# Patient Record
Sex: FEMALE | Race: WHITE | URBAN - METROPOLITAN AREA
[De-identification: names, ages, dates, MRNs, and addresses within clinical notes are randomized per-mention and may not be internally consistent; named-entity substitution may affect disease eponyms.]

---

## 2021-05-25 ENCOUNTER — RECORDS - HEALTHEAST (OUTPATIENT)
Dept: ADMINISTRATIVE | Facility: CLINIC | Age: 71
End: 2021-05-25

## 2021-05-29 ENCOUNTER — RECORDS - HEALTHEAST (OUTPATIENT)
Dept: ADMINISTRATIVE | Facility: CLINIC | Age: 71
End: 2021-05-29

## 2021-05-30 ENCOUNTER — RECORDS - HEALTHEAST (OUTPATIENT)
Dept: ADMINISTRATIVE | Facility: CLINIC | Age: 71
End: 2021-05-30

## 2021-05-31 ENCOUNTER — RECORDS - HEALTHEAST (OUTPATIENT)
Dept: ADMINISTRATIVE | Facility: CLINIC | Age: 71
End: 2021-05-31

## 2021-07-13 ENCOUNTER — RECORDS - HEALTHEAST (OUTPATIENT)
Dept: ADMINISTRATIVE | Facility: CLINIC | Age: 71
End: 2021-07-13

## 2021-07-21 ENCOUNTER — RECORDS - HEALTHEAST (OUTPATIENT)
Dept: ADMINISTRATIVE | Facility: CLINIC | Age: 71
End: 2021-07-21

## 2021-10-13 ENCOUNTER — APPOINTMENT (OUTPATIENT)
Dept: URBAN - METROPOLITAN AREA CLINIC 260 | Age: 71
Setting detail: DERMATOLOGY
End: 2021-10-13

## 2021-10-13 VITALS — WEIGHT: 135 LBS | HEIGHT: 60 IN

## 2021-10-13 DIAGNOSIS — L81.0 POSTINFLAMMATORY HYPERPIGMENTATION: ICD-10-CM

## 2021-10-13 DIAGNOSIS — L81.4 OTHER MELANIN HYPERPIGMENTATION: ICD-10-CM

## 2021-10-13 PROCEDURE — 99203 OFFICE O/P NEW LOW 30 MIN: CPT

## 2021-10-13 PROCEDURE — OTHER ADDITIONAL NOTES: OTHER

## 2021-10-13 PROCEDURE — OTHER COUNSELING: OTHER

## 2021-10-13 ASSESSMENT — LOCATION ZONE DERM
LOCATION ZONE: FACE
LOCATION ZONE: EYELID

## 2021-10-13 ASSESSMENT — LOCATION DETAILED DESCRIPTION DERM
LOCATION DETAILED: LEFT LATERAL CANTHUS
LOCATION DETAILED: LEFT INFERIOR MEDIAL FOREHEAD

## 2021-10-13 ASSESSMENT — LOCATION SIMPLE DESCRIPTION DERM
LOCATION SIMPLE: LEFT EYELID
LOCATION SIMPLE: LEFT FOREHEAD

## 2021-10-13 NOTE — PROCEDURE: ADDITIONAL NOTES
Detail Level: Detailed
Render Risk Assessment In Note?: no
Additional Notes: Patient scratched herself last summer. The area appears to be pih at this time. She will allow this to heal and may treat with laser if she would like. Her  pointed this out

## 2025-02-24 ENCOUNTER — APPOINTMENT (OUTPATIENT)
Dept: RADIOLOGY | Facility: CLINIC | Age: 75
End: 2025-02-24
Payer: COMMERCIAL

## 2025-02-24 ENCOUNTER — HOSPITAL ENCOUNTER (EMERGENCY)
Facility: CLINIC | Age: 75
Discharge: HOME | End: 2025-02-24
Attending: EMERGENCY MEDICINE | Admitting: EMERGENCY MEDICINE
Payer: COMMERCIAL

## 2025-02-24 VITALS
SYSTOLIC BLOOD PRESSURE: 172 MMHG | BODY MASS INDEX: 28.22 KG/M2 | DIASTOLIC BLOOD PRESSURE: 88 MMHG | OXYGEN SATURATION: 96 % | WEIGHT: 140 LBS | TEMPERATURE: 97.4 F | RESPIRATION RATE: 18 BRPM | HEIGHT: 59 IN | HEART RATE: 88 BPM

## 2025-02-24 DIAGNOSIS — M79.622 PAIN OF LEFT UPPER ARM: ICD-10-CM

## 2025-02-24 LAB
ANION GAP SERPL CALCULATED.3IONS-SCNC: 9 MMOL/L (ref 7–15)
ATRIAL RATE - MUSE: 83 BPM
BASOPHILS # BLD AUTO: 0 10E3/UL (ref 0–0.2)
BASOPHILS NFR BLD AUTO: 0 %
BUN SERPL-MCNC: 9.5 MG/DL (ref 8–23)
CALCIUM SERPL-MCNC: 9 MG/DL (ref 8.8–10.4)
CHLORIDE SERPL-SCNC: 108 MMOL/L (ref 98–107)
CREAT SERPL-MCNC: 0.66 MG/DL (ref 0.51–0.95)
DIASTOLIC BLOOD PRESSURE - MUSE: NORMAL MMHG
EGFRCR SERPLBLD CKD-EPI 2021: >90 ML/MIN/1.73M2
EOSINOPHIL # BLD AUTO: 0 10E3/UL (ref 0–0.7)
EOSINOPHIL NFR BLD AUTO: 0 %
ERYTHROCYTE [DISTWIDTH] IN BLOOD BY AUTOMATED COUNT: 14.6 % (ref 10–15)
GLUCOSE SERPL-MCNC: 108 MG/DL (ref 70–99)
HCO3 SERPL-SCNC: 26 MMOL/L (ref 22–29)
HCT VFR BLD AUTO: 38.5 % (ref 35–47)
HGB BLD-MCNC: 12.8 G/DL (ref 11.7–15.7)
IMM GRANULOCYTES # BLD: 0 10E3/UL
IMM GRANULOCYTES NFR BLD: 0 %
INTERPRETATION ECG - MUSE: NORMAL
LYMPHOCYTES # BLD AUTO: 1.4 10E3/UL (ref 0.8–5.3)
LYMPHOCYTES NFR BLD AUTO: 14 %
MCH RBC QN AUTO: 28.3 PG (ref 26.5–33)
MCHC RBC AUTO-ENTMCNC: 33.2 G/DL (ref 31.5–36.5)
MCV RBC AUTO: 85 FL (ref 78–100)
MONOCYTES # BLD AUTO: 0.5 10E3/UL (ref 0–1.3)
MONOCYTES NFR BLD AUTO: 5 %
NEUTROPHILS # BLD AUTO: 8.1 10E3/UL (ref 1.6–8.3)
NEUTROPHILS NFR BLD AUTO: 81 %
NRBC # BLD AUTO: 0 10E3/UL
NRBC BLD AUTO-RTO: 0 /100
P AXIS - MUSE: 68 DEGREES
PLATELET # BLD AUTO: 276 10E3/UL (ref 150–450)
POTASSIUM SERPL-SCNC: 3.9 MMOL/L (ref 3.4–5.3)
PR INTERVAL - MUSE: 148 MS
QRS DURATION - MUSE: 74 MS
QT - MUSE: 386 MS
QTC - MUSE: 453 MS
R AXIS - MUSE: 28 DEGREES
RBC # BLD AUTO: 4.52 10E6/UL (ref 3.8–5.2)
SODIUM SERPL-SCNC: 143 MMOL/L (ref 135–145)
SYSTOLIC BLOOD PRESSURE - MUSE: NORMAL MMHG
T AXIS - MUSE: 61 DEGREES
TROPONIN T SERPL HS-MCNC: 8 NG/L
VENTRICULAR RATE- MUSE: 83 BPM
WBC # BLD AUTO: 10 10E3/UL (ref 4–11)

## 2025-02-24 PROCEDURE — 80048 BASIC METABOLIC PNL TOTAL CA: CPT

## 2025-02-24 PROCEDURE — 93005 ELECTROCARDIOGRAM TRACING: CPT | Performed by: EMERGENCY MEDICINE

## 2025-02-24 PROCEDURE — 99285 EMERGENCY DEPT VISIT HI MDM: CPT | Mod: 25

## 2025-02-24 PROCEDURE — 85014 HEMATOCRIT: CPT

## 2025-02-24 PROCEDURE — 82310 ASSAY OF CALCIUM: CPT

## 2025-02-24 PROCEDURE — 85004 AUTOMATED DIFF WBC COUNT: CPT

## 2025-02-24 PROCEDURE — 84484 ASSAY OF TROPONIN QUANT: CPT

## 2025-02-24 PROCEDURE — 36415 COLL VENOUS BLD VENIPUNCTURE: CPT

## 2025-02-24 PROCEDURE — 71046 X-RAY EXAM CHEST 2 VIEWS: CPT

## 2025-02-24 ASSESSMENT — COLUMBIA-SUICIDE SEVERITY RATING SCALE - C-SSRS
2. HAVE YOU ACTUALLY HAD ANY THOUGHTS OF KILLING YOURSELF IN THE PAST MONTH?: NO
1. IN THE PAST MONTH, HAVE YOU WISHED YOU WERE DEAD OR WISHED YOU COULD GO TO SLEEP AND NOT WAKE UP?: NO
6. HAVE YOU EVER DONE ANYTHING, STARTED TO DO ANYTHING, OR PREPARED TO DO ANYTHING TO END YOUR LIFE?: NO

## 2025-02-24 ASSESSMENT — ACTIVITIES OF DAILY LIVING (ADL)
ADLS_ACUITY_SCORE: 41
ADLS_ACUITY_SCORE: 41

## 2025-02-24 NOTE — ED PROVIDER NOTES
"EMERGENCY DEPARTMENT MIDLEVEL SUPERVISORY NOTE     I had a face to face encounter with this patient seen by the Advanced Practice Provider (ROBYN). I personally made/approved the management plan and take responsibility for the patient management. I personally saw patient and performed a substantive portion of the visit including all aspects of the medical decision making.      ED Course:  11:36 AM  Marietta Wilson PA-C staffed patient with me. I agree with their assessment and plan of management, and I will see the patient.  11:40 AM I met with the patient to introduce myself, gather additional history, perform my initial exam, and discuss the plan.      Brief HPI:     Concepcion Jackson is a 74 year old year old female who presents for evaluation of arm pain.    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 She awoke today at 7:00AM with left arm pain. The pain radiated from her shoulder to her elbow. She describes it as slightly more painful than a pulled muscle. She called nurse triage, who referred her to the ED for cardiac workup. Pain was resolved with Tylenol and heat.    Reports mild congestion from allergies. Denies diaphoresis. Denies shortness of breath. Denies chest pain. Denies dyspnea on exertion. Patient had a sinus infection two weeks ago that was treated & resolved with azithromycin. Patient routinely exercises.     I, Crissy Oviedo, am serving as a scribe to document services personally performed by Chelita Grimaldo MD, based on my observations and the provider's statements to me.  I, Chelita Grimaldo MD, attest that Crissy Oviedo is acting in a scribe capacity, has observed my performance of the services and has documented them in accordance with my direction.     Brief Physical Exam: BP (!) 172/88   Pulse 88   Temp 97.4  F (36.3  C) (Temporal)   Resp 18   Ht 1.499 m (4' 11\")   Wt 63.5 kg (140 lb)   SpO2 96%   BMI " 28.28 kg/m     Constitutional:   Awake and alert   HENT:  Normocephalic, posterior pharynx wnl  Eyes:  PERRL, EOMI, Conjunctiva normal, No discharge, no scleral icterus.  Respiratory:  Breathing easily, Lungs clear to auscultation.  Cardiovascular:  Regular rate and rhythm, nl s1s2 0 murmurs, rubs, or gallops.  Peripheral pulses dp, pt, and radial are wnl.  GI:  Bowel sounds normal, Soft, No tenderness, No flank tenderness, nondistended.  :No CVA tenderness.   Musculoskeletal:  Moves all extremities.  No erythematous or swollen major joints.   Integument:  Normal skin color.  Neurologic:  Alert & oriented x 3, Normal motor function, Normal sensory function, No focal deficits noted. Normal speech.  Psychiatric:  Affect normal, Judgment normal, Mood normal.          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  Unlikely its muscle strain as her job is generally physical.  No instability in the shoulder joint itself but her shoulders do roll forwards a bit.  Nothing that sounds cardiac nor has she had any warning symptoms of cardiac symptoms recently, no infectious symptoms and the left shoulder moves freely with passive range of motion without either pain or instability so again is inconsistent with either infection or significant acute injury or tear.    So, she did want to complete the blood work today to rule out heart disease which I think is fine.  Her initial EKG is nonfocal.  Patient will be discharged if this workup is normal to follow-up with her primary care clinic who then will set up a referral for PT which she is interested in doing.   1245  Cp was from early this am, and other source more likely so no repeat trop indicated when suspicion very low and other cause more likely.  Pt discharged home.      No diagnosis found.      Labs and Imaging:  Results for orders placed or performed during the hospital encounter of 02/24/25   Chest XR,  PA & LAT    Impression    IMPRESSION: Negative chest.   Basic metabolic panel   Result Value Ref Range    Sodium 143 135 - 145 mmol/L    Potassium 3.9 3.4 - 5.3 mmol/L    Chloride 108 (H) 98 - 107 mmol/L    Carbon Dioxide (CO2) 26 22 - 29 mmol/L    Anion Gap 9 7 - 15 mmol/L    Urea Nitrogen 9.5 8.0 - 23.0 mg/dL    Creatinine 0.66 0.51 - 0.95 mg/dL    GFR Estimate >90 >60 mL/min/1.73m2    Calcium 9.0 8.8 - 10.4 mg/dL    Glucose 108 (H) 70 - 99 mg/dL   Result Value Ref Range    Troponin T, High Sensitivity 8 <=14 ng/L   CBC with platelets and differential   Result Value Ref Range    WBC Count 10.0 4.0 - 11.0 10e3/uL    RBC Count 4.52 3.80 - 5.20 10e6/uL    Hemoglobin 12.8 11.7 - 15.7 g/dL    Hematocrit 38.5 35.0 - 47.0 %    MCV 85 78 - 100 fL    MCH 28.3 26.5 - 33.0 pg    MCHC 33.2 31.5 - 36.5 g/dL    RDW 14.6 10.0 - 15.0 %    Platelet Count 276 150 - 450 10e3/uL    % Neutrophils 81 %    % Lymphocytes 14 %    % Monocytes 5 %    % Eosinophils 0 %    % Basophils 0 %    % Immature Granulocytes 0 %    NRBCs per 100 WBC 0 <1 /100    Absolute Neutrophils 8.1 1.6 - 8.3 10e3/uL    Absolute Lymphocytes 1.4 0.8 - 5.3 10e3/uL    Absolute Monocytes 0.5 0.0 - 1.3 10e3/uL    Absolute Eosinophils 0.0 0.0 - 0.7 10e3/uL    Absolute Basophils 0.0 0.0 - 0.2 10e3/uL    Absolute Immature Granulocytes 0.0 <=0.4 10e3/uL    Absolute NRBCs 0.0 10e3/uL   ECG 12-LEAD WITH MUSE (LHE)   Result Value Ref Range    Systolic Blood Pressure  mmHg    Diastolic Blood Pressure  mmHg    Ventricular Rate 83 BPM    Atrial Rate 83 BPM    PA Interval 148 ms    QRS Duration 74 ms     ms    QTc 453 ms    P Axis 68 degrees    R AXIS 28 degrees    T Axis 61  degrees    Interpretation ECG       Sinus rhythm  Possible Left atrial enlargement  Borderline ECG  No previous ECGs available  Confirmed by SEE ED PROVIDER NOTE FOR, ECG INTERPRETATION ([id number]),  Bonny Dudley ([id number]) on 2/24/2025 10:38:38 AM        I have reviewed the relevant laboratory studies above.     I independently interpreted the following imaging study(s):   Chest XR,  PA & LAT   Final Result   IMPRESSION: Negative chest.            EKG:   I reviewed and independently interpreted the patient's EKG, with comments made as listed below. Please see scanned EKG for full report.   Performed at:  0923  Impression:  nsr rate of 83, possible lae, otherwise normal EKG.  Pr 148ms, qrs 74ms, qtc 453ms, prt axes 68 28 61.  No previous EKG available.     Procedures:  I was present for the key portions of procedures documented in ROBYN/midlevel note, see midlevel note for further details.     Chelita Grimaldo MD  [facility] EMERGENCY DEPARTMENT  [facility] [facility] [id number] [phone]       Chelita Grimaldo MD  02/24/25 2053

## 2025-02-24 NOTE — ED TRIAGE NOTES
Patient states she works as a , she has worked the last 4 days and states that is a lot, now c/o left upper arm pain. She took Tylenol and it was better.  Her clinic sent here for a cardiac work up.      Triage Assessment (Adult)       Row Name 02/24/25 0914          Triage Assessment    Airway WDL WDL        Respiratory WDL    Respiratory WDL WDL        Skin Circulation/Temperature WDL    Skin Circulation/Temperature WDL WDL        Cardiac WDL    Cardiac WDL WDL        Peripheral/Neurovascular WDL    Peripheral Neurovascular WDL WDL        Cognitive/Neuro/Behavioral WDL    Cognitive/Neuro/Behavioral WDL WDL

## 2025-02-24 NOTE — DISCHARGE INSTRUCTIONS
You were seen in the emergency department for evaluation of left arm pain.  Thankfully no evidence of heart attack.  You can continue to use heat on the upper arm.  You can try some ice on the shoulder in case you are starting to get bursitis or more inflammatory process in the shoulder.  Please follow-up with your primary care provider and discuss possibly starting physical therapy.    Return to the emergency department for new or worsening chest pain, shortness of breath, or any other concerning symptoms.

## 2025-02-24 NOTE — Clinical Note
Concepcion Jackson was seen and treated in our emergency department on 2/24/2025.  She may return to work on 02/28/2025.       If you have any questions or concerns, please don't hesitate to call.      Marietta Wilson PA-C

## 2025-02-24 NOTE — ED PROVIDER NOTES
Emergency Department Encounter   NAME: Concepcion Jackson  AGE: 74 year old female  YOB: 1950  MRN: [id number]    PCP: Nelida Campbell  ED PROVIDER: Marietta Wilson PA-C    Evaluation Date & Time:   No admission date for patient encounter.    CHIEF COMPLAINT:  Arm Pain      Impression and Plan   MDM: 74-year-old female with history of hyperlipidemia mild asthma presents for evaluation of left upper arm pain.  Lasted for 30 minutes this morning.  Resolved after Tylenol and heat.  Called nurse triage line who recommended she come to the emergency department for cardiac evaluation.  On arrival here patient is hypertensive at 172/88, otherwise vitally stable.  Afebrile.  On my exam patient is in no acute distress.  Unremarkable cardiac and pulmonary exams.  Upper extremities are neurovascularly intact.  No swelling of the left upper extremity to suggest DVT.  Some mild tenderness over the left bicep and tricep without any obvious deformity or evidence of trauma.  No neck pain or radicular symptoms to suggest cervical etiology of her discomfort.  No trauma to suggest acute fracture.  I do suspect the patient likely has a muscle strain after working 4 shifts in a row which is longer than what she typically does.  She was sent here for cardiac evaluation.  An EKG was gathered on arrival which shows sinus rhythm.  No evidence of ischemia.  I discussed this with the patient.  I have low suspicion for cardiac etiology of her symptoms.  Though she was sent here for cardiac evaluation.  I discussed this with the patient.  She would feel very reassured if we did some additional lab work which I think is reasonable.  Will get CBC, BMP, troponin, chest x-ray.  Nothing to suggest dissection or acute aortic syndrome.  I do not think any CT imaging is indicated at this time which patient is agreeable to.  Patient declines any pain medication as her pain has completely resolved.  She is agreeable with the plan.    Lab  work here without any leukocytosis or anemia.  No concerning electrolyte abnormality or RAJI.  EKG without evidence of ischemia.  Troponin reassuring at 8.  ACS is unlikely.  Chest x-ray per my independent interpretation without any consolidation concerning for pneumonia or pneumothorax.  Supported by radiology read.    I reassessed the patient.  She continues to rest comfortably and has not had any recurrence of this left upper arm pain.  We discussed reassuring lab and imaging results.  Plan to follow-up with PCP to discuss possibly starting physical therapy for this pain.  Continue Tylenol, heat/ice as needed for discomfort.  We reviewed strict return precautions and patient was discharged home in stable condition.      I have staffed the patient with Dr. Grimaldo, ED physician, who will evaluate the patient and agrees with all aspects of today's care.     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  Unlikely its muscle strain as her job is generally physical.  No instability in the shoulder joint itself but her shoulders do roll forwards a bit.  Nothing that sounds cardiac nor has she had any warning symptoms of cardiac symptoms recently, no infectious symptoms and the left shoulder moves freely with passive range of motion without either pain or instability so again is inconsistent with either infection or significant acute injury or tear.    So, she did want to complete the blood work today to rule out heart disease which I think is fine.  Her initial EKG is nonfocal.  Patient will be discharged if this workup is normal to follow-up with her primary care  clinic who then will set up a referral for PT which she is interested in doing.   1245 Cp was from early this am, and other source more likely so no repeat trop indicated when suspicion very low and other cause more likely.  Pt discharged home.       Medical Decision Making  Obtained supplemental history:Supplemental history obtained?: No  Reviewed external records: External records reviewed?: No  Care impacted by chronic illness:Documented in Chart  Did you consider but not order tests?: Work up considered but not performed and documented in chart, if applicable  Did you interpret images independently?: Independent interpretation of ECG and images noted in documentation, when applicable.  Consultation discussion with other provider:Did you involve another provider (consultant, , pharmacy, etc.)?: No  Discharge. No recommendations on prescription strength medication(s). N/A.    MIPS (CTPE, Dental pain, Abbott, Sinusitis, Asthma/COPD, Head Trauma): Not Applicable        FINAL IMPRESSION:    ICD-10-CM    1. Pain of left upper arm  M79.622             MEDICATIONS GIVEN IN THE EMERGENCY DEPARTMENT:  Medications - No data to display      NEW PRESCRIPTIONS STARTED AT TODAY'S ED VISIT:  There are no discharge medications for this patient.        HPI   Patient information was obtained from: patient   Use of Intrepreter: N/A     Concepcion Jackson is a 74 year old female with a pertinent history of hyperlipidemia and mild asthma who presents to the ED by car for evaluation of left arm pain.  Woke up this morning at 7 am with left arm pain from shoulder to elbow.  She took Tylenol and used a heating pad and the pain was resolved by 730.  She did not have any chest pain or shortness of breath.  She has gotten strained muscles before and this did not feel similar.  Though she does note that she works as a  and has been working the last 4 days.  She called the nurse triage line who referred her to the emergency  "department for cardiac workup.  Patient has no personal history of cardiac or pulmonary disease.  No trauma.  No numbness, tingling, weakness.  No neck pain.      REVIEW OF SYSTEMS:  Pertinent positive and negative symptoms per HPI.       Physical Exam     First Vitals:  Patient Vitals for the past 24 hrs:   BP Temp Temp src Pulse Resp SpO2 Height Weight   02/24/25 0917 (!) 172/88 97.4  F (36.3  C) Temporal 88 18 96 % 1.499 m (4' 11\") 63.5 kg (140 lb)       PHYSICAL EXAM:   General Appearance:  Alert, cooperative, no distress, appears stated age  HENT: Normocephalic without obvious deformity, atraumatic. Mucous membranes moist   Eyes: Conjunctiva clear, Lids normal. No discharge.   Respiratory: No distress. Lungs clear to ausculation bilaterally. No wheezes, rhonchi or stridor  Cardiovascular: Regular rate and rhythm, no murmur. Normal cap refill. No peripheral edema.  2+ radial and ulnar pulses bilaterally.  Musculoskeletal: Moving all extremities. No gross deformities.  Tenderness palpation over the left bicep and tricep.  No palpable deformities, crepitus, step-offs.  Full range of motion of left shoulder and elbow.  Integument: Warm, dry, no rashes or lesions  Neurologic: Alert and orientated x3.   Psych: Normal mood and affect      Results     LAB:  All pertinent labs reviewed and interpreted  Labs Ordered and Resulted from Time of ED Arrival to Time of ED Departure   BASIC METABOLIC PANEL - Abnormal       Result Value    Sodium 143      Potassium 3.9      Chloride 108 (*)     Carbon Dioxide (CO2) 26      Anion Gap 9      Urea Nitrogen 9.5      Creatinine 0.66      GFR Estimate >90      Calcium 9.0      Glucose 108 (*)    TROPONIN T, HIGH SENSITIVITY - Normal    Troponin T, High Sensitivity 8     CBC WITH PLATELETS AND DIFFERENTIAL    WBC Count 10.0      RBC Count 4.52      Hemoglobin 12.8      Hematocrit 38.5      MCV 85      MCH 28.3      MCHC 33.2      RDW 14.6      Platelet Count 276      % Neutrophils 81   "    % Lymphocytes 14      % Monocytes 5      % Eosinophils 0      % Basophils 0      % Immature Granulocytes 0      NRBCs per 100 WBC 0      Absolute Neutrophils 8.1      Absolute Lymphocytes 1.4      Absolute Monocytes 0.5      Absolute Eosinophils 0.0      Absolute Basophils 0.0      Absolute Immature Granulocytes 0.0      Absolute NRBCs 0.0         RADIOLOGY:  Chest XR,  PA & LAT   Final Result   IMPRESSION: Negative chest.          ECG:    Performed at: 0923    Impression: sinus rhythm    Rate: 83  Rhythm: sinus  AR Interval: 148  QRS Interval: 74  QTc Interval: 453  ST Changes: none  Comparison: no previous    EKG results reviewed and interpreted by Dr. Grimaldo, ED MD.         Marietta Wilson PA-C   Emergency Medicine   [facility] EMERGENCY ROOM       Marietta Wilson PA-C  02/24/25 1469